# Patient Record
Sex: MALE | Race: WHITE | HISPANIC OR LATINO | URBAN - METROPOLITAN AREA
[De-identification: names, ages, dates, MRNs, and addresses within clinical notes are randomized per-mention and may not be internally consistent; named-entity substitution may affect disease eponyms.]

---

## 2017-02-06 ENCOUNTER — OUTPATIENT (OUTPATIENT)
Dept: OUTPATIENT SERVICES | Facility: HOSPITAL | Age: 72
LOS: 1 days | Discharge: HOME | End: 2017-02-06

## 2017-06-27 DIAGNOSIS — K21.9 GASTRO-ESOPHAGEAL REFLUX DISEASE WITHOUT ESOPHAGITIS: ICD-10-CM

## 2017-06-27 DIAGNOSIS — Z23 ENCOUNTER FOR IMMUNIZATION: ICD-10-CM

## 2017-06-27 DIAGNOSIS — H81.10 BENIGN PAROXYSMAL VERTIGO, UNSPECIFIED EAR: ICD-10-CM

## 2017-06-27 DIAGNOSIS — R94.5 ABNORMAL RESULTS OF LIVER FUNCTION STUDIES: ICD-10-CM

## 2017-10-24 PROBLEM — Z00.00 ENCOUNTER FOR PREVENTIVE HEALTH EXAMINATION: Status: ACTIVE | Noted: 2017-10-24

## 2017-12-04 ENCOUNTER — APPOINTMENT (OUTPATIENT)
Dept: UROLOGY | Facility: CLINIC | Age: 72
End: 2017-12-04

## 2018-01-16 ENCOUNTER — APPOINTMENT (OUTPATIENT)
Dept: UROLOGY | Facility: CLINIC | Age: 73
End: 2018-01-16
Payer: MEDICARE

## 2018-01-16 VITALS
WEIGHT: 170 LBS | BODY MASS INDEX: 24.34 KG/M2 | HEART RATE: 99 BPM | SYSTOLIC BLOOD PRESSURE: 130 MMHG | HEIGHT: 70 IN | DIASTOLIC BLOOD PRESSURE: 72 MMHG

## 2018-01-16 DIAGNOSIS — Z63.4 DISAPPEARANCE AND DEATH OF FAMILY MEMBER: ICD-10-CM

## 2018-01-16 DIAGNOSIS — Z87.891 PERSONAL HISTORY OF NICOTINE DEPENDENCE: ICD-10-CM

## 2018-01-16 DIAGNOSIS — E78.00 PURE HYPERCHOLESTEROLEMIA, UNSPECIFIED: ICD-10-CM

## 2018-01-16 LAB
BILIRUB UR QL STRIP: NORMAL
CLARITY UR: CLEAR
COLLECTION METHOD: NORMAL
GLUCOSE UR-MCNC: NORMAL
HCG UR QL: 2 MG/DL
HGB UR QL STRIP.AUTO: NORMAL
KETONES UR-MCNC: NORMAL
LEUKOCYTE ESTERASE UR QL STRIP: NORMAL
NITRITE UR QL STRIP: NORMAL
PH UR STRIP: 6
PROT UR STRIP-MCNC: NORMAL
SP GR UR STRIP: 1.01

## 2018-01-16 PROCEDURE — 99205 OFFICE O/P NEW HI 60 MIN: CPT

## 2018-01-16 PROCEDURE — 81003 URINALYSIS AUTO W/O SCOPE: CPT | Mod: QW

## 2018-01-16 RX ORDER — SIMVASTATIN 20 MG/1
20 TABLET, FILM COATED ORAL
Refills: 0 | Status: ACTIVE | COMMUNITY

## 2018-01-16 SDOH — SOCIAL STABILITY - SOCIAL INSECURITY: DISSAPEARANCE AND DEATH OF FAMILY MEMBER: Z63.4

## 2018-02-06 ENCOUNTER — APPOINTMENT (OUTPATIENT)
Dept: UROLOGY | Facility: CLINIC | Age: 73
End: 2018-02-06
Payer: MEDICARE

## 2018-02-06 DIAGNOSIS — M19.90 UNSPECIFIED OSTEOARTHRITIS, UNSPECIFIED SITE: ICD-10-CM

## 2018-02-06 PROCEDURE — 99214 OFFICE O/P EST MOD 30 MIN: CPT

## 2018-02-12 LAB
PSA FREE FLD-MCNC: 20.6
PSA FREE SERPL-MCNC: 1.02 NG/ML
PSA SERPL-MCNC: 4.96 NG/ML

## 2018-02-12 RX ORDER — AMOXICILLIN AND CLAVULANATE POTASSIUM 875; 125 MG/1; MG/1
875-125 TABLET, COATED ORAL TWICE DAILY
Qty: 6 | Refills: 0 | Status: ACTIVE | COMMUNITY
Start: 2018-02-12 | End: 1900-01-01

## 2018-02-14 ENCOUNTER — APPOINTMENT (OUTPATIENT)
Dept: HEMATOLOGY ONCOLOGY | Facility: CLINIC | Age: 73
End: 2018-02-14

## 2018-03-08 ENCOUNTER — APPOINTMENT (OUTPATIENT)
Dept: UROLOGY | Facility: CLINIC | Age: 73
End: 2018-03-08
Payer: MEDICARE

## 2018-03-08 ENCOUNTER — OUTPATIENT (OUTPATIENT)
Dept: OUTPATIENT SERVICES | Facility: HOSPITAL | Age: 73
LOS: 1 days | End: 2018-03-08
Payer: MEDICARE

## 2018-03-08 VITALS
BODY MASS INDEX: 24.34 KG/M2 | TEMPERATURE: 97.7 F | HEIGHT: 70 IN | WEIGHT: 170 LBS | HEART RATE: 90 BPM | RESPIRATION RATE: 16 BRPM | SYSTOLIC BLOOD PRESSURE: 131 MMHG | DIASTOLIC BLOOD PRESSURE: 73 MMHG

## 2018-03-08 DIAGNOSIS — R35.0 FREQUENCY OF MICTURITION: ICD-10-CM

## 2018-03-08 PROCEDURE — 55700: CPT

## 2018-03-08 PROCEDURE — 76872 US TRANSRECTAL: CPT

## 2018-03-08 PROCEDURE — 76942 ECHO GUIDE FOR BIOPSY: CPT | Mod: 26,59

## 2018-03-08 PROCEDURE — 76942 ECHO GUIDE FOR BIOPSY: CPT | Mod: 59

## 2018-03-08 PROCEDURE — 76872 US TRANSRECTAL: CPT | Mod: 26

## 2018-03-08 RX ORDER — AMIKACIN SULFATE 250 MG/ML
500 INJECTION, SOLUTION INTRAMUSCULAR; INTRAVENOUS
Qty: 2 | Refills: 0 | Status: COMPLETED | OUTPATIENT
Start: 2018-03-08 | End: 2018-03-08

## 2018-03-08 RX ORDER — AMIKACIN SULFATE 250 MG/ML
500 INJECTION, SOLUTION INTRAMUSCULAR; INTRAVENOUS
Refills: 0 | Status: COMPLETED | OUTPATIENT
Start: 2018-03-08

## 2018-03-08 RX ADMIN — AMIKACIN SULFATE 0 MG/2ML: 500 INJECTION, SOLUTION INTRAMUSCULAR; INTRAVENOUS at 00:00

## 2018-03-12 DIAGNOSIS — N40.2 NODULAR PROSTATE WITHOUT LOWER URINARY TRACT SYMPTOMS: ICD-10-CM

## 2018-03-12 DIAGNOSIS — R93.5 ABNORMAL FINDINGS ON DIAGNOSTIC IMAGING OF OTHER ABDOMINAL REGIONS, INCLUDING RETROPERITONEUM: ICD-10-CM

## 2018-03-12 LAB — CORE LAB BIOPSY: NORMAL

## 2018-03-22 ENCOUNTER — APPOINTMENT (OUTPATIENT)
Dept: UROLOGY | Facility: CLINIC | Age: 73
End: 2018-03-22

## 2018-03-29 ENCOUNTER — APPOINTMENT (OUTPATIENT)
Dept: UROLOGY | Facility: CLINIC | Age: 73
End: 2018-03-29
Payer: MEDICARE

## 2018-03-29 VITALS
DIASTOLIC BLOOD PRESSURE: 69 MMHG | WEIGHT: 170 LBS | HEIGHT: 70 IN | SYSTOLIC BLOOD PRESSURE: 119 MMHG | HEART RATE: 86 BPM | BODY MASS INDEX: 24.34 KG/M2

## 2018-03-29 PROCEDURE — 99213 OFFICE O/P EST LOW 20 MIN: CPT

## 2018-03-29 RX ORDER — ALFUZOSIN HYDROCHLORIDE 10 MG/1
10 TABLET, EXTENDED RELEASE ORAL
Qty: 30 | Refills: 5 | Status: ACTIVE | COMMUNITY
Start: 2018-03-29 | End: 1900-01-01

## 2018-04-13 ENCOUNTER — RX RENEWAL (OUTPATIENT)
Age: 73
End: 2018-04-13

## 2018-04-13 RX ORDER — ALFUZOSIN HYDROCHLORIDE 10 MG/1
10 TABLET, EXTENDED RELEASE ORAL
Qty: 90 | Refills: 3 | Status: ACTIVE | COMMUNITY
Start: 2018-04-13 | End: 1900-01-01

## 2018-04-19 ENCOUNTER — RX RENEWAL (OUTPATIENT)
Age: 73
End: 2018-04-19

## 2018-05-01 ENCOUNTER — APPOINTMENT (OUTPATIENT)
Dept: UROLOGY | Facility: CLINIC | Age: 73
End: 2018-05-01

## 2018-06-21 ENCOUNTER — APPOINTMENT (OUTPATIENT)
Dept: UROLOGY | Facility: CLINIC | Age: 73
End: 2018-06-21

## 2019-01-02 ENCOUNTER — APPOINTMENT (OUTPATIENT)
Dept: UROLOGY | Facility: CLINIC | Age: 74
End: 2019-01-02

## 2019-06-27 ENCOUNTER — RX RENEWAL (OUTPATIENT)
Age: 74
End: 2019-06-27

## 2019-09-23 ENCOUNTER — RX RENEWAL (OUTPATIENT)
Age: 74
End: 2019-09-23

## 2019-09-23 RX ORDER — ALFUZOSIN HYDROCHLORIDE 10 MG/1
10 TABLET, EXTENDED RELEASE ORAL
Qty: 90 | Refills: 3 | Status: ACTIVE | COMMUNITY
Start: 2018-04-19 | End: 1900-01-01

## 2020-02-04 ENCOUNTER — APPOINTMENT (OUTPATIENT)
Dept: UROLOGY | Facility: CLINIC | Age: 75
End: 2020-02-04
Payer: MEDICARE

## 2020-02-04 LAB
BILIRUB UR QL STRIP: NORMAL
COLLECTION METHOD: NORMAL
GLUCOSE UR-MCNC: NORMAL
HCG UR QL: 2 MG/DL
HGB UR QL STRIP.AUTO: NORMAL
KETONES UR-MCNC: NORMAL
LEUKOCYTE ESTERASE UR QL STRIP: NORMAL
NITRITE UR QL STRIP: NORMAL
PH UR STRIP: 6
PROT UR STRIP-MCNC: NORMAL
SP GR UR STRIP: 1.02

## 2020-02-04 PROCEDURE — 81003 URINALYSIS AUTO W/O SCOPE: CPT | Mod: QW

## 2020-02-04 PROCEDURE — 99213 OFFICE O/P EST LOW 20 MIN: CPT

## 2020-02-04 NOTE — PHYSICAL EXAM
[General Appearance - Well Developed] : well developed [Normal Appearance] : normal appearance [Well Groomed] : well groomed [General Appearance - Well Nourished] : well nourished [General Appearance - In No Acute Distress] : no acute distress [Abdomen Soft] : soft [Abdomen Tenderness] : non-tender [Costovertebral Angle Tenderness] : no ~M costovertebral angle tenderness [Scrotum] : the scrotum was normal [Testes Mass (___cm)] : there were no testicular masses [Testes Tenderness] : no tenderness of the testes [] : no respiratory distress [Edema] : no peripheral edema [Respiration, Rhythm And Depth] : normal respiratory rhythm and effort [Exaggerated Use Of Accessory Muscles For Inspiration] : no accessory muscle use [Oriented To Time, Place, And Person] : oriented to person, place, and time [Affect] : the affect was normal [Mood] : the mood was normal [Normal Station and Gait] : the gait and station were normal for the patient's age [Not Anxious] : not anxious [No Focal Deficits] : no focal deficits [No Palpable Adenopathy] : no palpable adenopathy

## 2020-02-04 NOTE — HISTORY OF PRESENT ILLNESS
[Urinary Frequency] : urinary frequency [Nocturia] : nocturia [Weak Stream] : weak stream [None] : None [FreeTextEntry1] : 74 y.o male with h/o BPH, prostate nodule seen on MP MRI prostate\par s/p MRI guided prostate biopsy with Dr. Gallardo 3/8/2018 / TRUS 87gms/  path- BPH\par pt. doing ok\par on flomax x many years\par c/o seen blod clot with x 3-4  1-2 months ago -- no gross  hematuria \par gives h/o TUNA many years ago\par u/a neg\par 2/2018 psa = 4.6\par \par  [Urinary Incontinence] : no urinary incontinence [Urinary Urgency] : no urinary urgency [Urinary Retention] : no urinary retention [Hematuria - Gross] : no gross hematuria [Dysuria] : no dysuria [Abdominal Pain] : no abdominal pain [Flank Pain] : no flank pain [Fever] : no fever [Anorexia] : no anorexia

## 2020-02-04 NOTE — ASSESSMENT
[FreeTextEntry1] :  1.  BPH,\par 2.  prostate nodule seen on MP MRI prostate -- neg PNBX x 2- last MRI fusion withDr. Gallardo 3/2018\par 3. Urethrorraghia \par \par plan = \par -switch to flomkax - side effects and directions discussed\par -dw pt. addition of avodart, but he is not interested secondary to side effects\par -renal and bladder US, uroflow\par -psa \par - rtn 3-4 weeks

## 2020-02-18 LAB
PSA FREE FLD-MCNC: 21 %
PSA FREE SERPL-MCNC: 0.8 NG/ML
PSA SERPL-MCNC: 3.79 NG/ML

## 2020-05-06 ENCOUNTER — APPOINTMENT (OUTPATIENT)
Dept: UROLOGY | Facility: CLINIC | Age: 75
End: 2020-05-06

## 2021-05-01 ENCOUNTER — RESULT REVIEW (OUTPATIENT)
Age: 76
End: 2021-05-01

## 2021-05-01 ENCOUNTER — OUTPATIENT (OUTPATIENT)
Dept: OUTPATIENT SERVICES | Facility: HOSPITAL | Age: 76
LOS: 1 days | Discharge: HOME | End: 2021-05-01
Payer: MEDICARE

## 2021-05-01 DIAGNOSIS — R97.20 ELEVATED PROSTATE SPECIFIC ANTIGEN [PSA]: ICD-10-CM

## 2021-05-01 DIAGNOSIS — N40.1 BENIGN PROSTATIC HYPERPLASIA WITH LOWER URINARY TRACT SYMPTOMS: ICD-10-CM

## 2021-05-01 LAB
PSA FREE FLD-MCNC: 20 %
PSA FREE SERPL-MCNC: 0.81 NG/ML
PSA SERPL-MCNC: 3.96 NG/ML

## 2021-05-01 PROCEDURE — 76770 US EXAM ABDO BACK WALL COMP: CPT | Mod: 26

## 2021-05-06 ENCOUNTER — APPOINTMENT (OUTPATIENT)
Dept: UROLOGY | Facility: CLINIC | Age: 76
End: 2021-05-06
Payer: MEDICARE

## 2021-05-06 PROCEDURE — 51741 ELECTRO-UROFLOWMETRY FIRST: CPT

## 2021-05-06 PROCEDURE — 51798 US URINE CAPACITY MEASURE: CPT

## 2021-05-06 PROCEDURE — 99072 ADDL SUPL MATRL&STAF TM PHE: CPT

## 2021-05-06 PROCEDURE — 99213 OFFICE O/P EST LOW 20 MIN: CPT | Mod: 25

## 2021-10-28 ENCOUNTER — LABORATORY RESULT (OUTPATIENT)
Age: 76
End: 2021-10-28

## 2021-11-09 ENCOUNTER — APPOINTMENT (OUTPATIENT)
Dept: UROLOGY | Facility: CLINIC | Age: 76
End: 2021-11-09
Payer: MEDICARE

## 2021-11-09 PROCEDURE — 99214 OFFICE O/P EST MOD 30 MIN: CPT

## 2021-11-09 RX ORDER — TAMSULOSIN HYDROCHLORIDE 0.4 MG/1
0.4 CAPSULE ORAL
Refills: 0 | Status: DISCONTINUED | COMMUNITY
End: 2021-11-09

## 2021-11-09 NOTE — ASSESSMENT
[FreeTextEntry1] : 1. BPH\par 2. prostate nodule seen on MP MRI prostate -- neg PNBX x 2- last MRI fusion with Dr. Gallardo 3/2018.\par 3. newly elevated PSA \par 4. Bilateral renal cysts\par \par Plan:\par -Discussed PSA results with the patient.\par -Discussed the significance of mpMRI.\par -mpMRI now.\par -Continue Alfuzosin.\par -RTO 4 weeks.\par

## 2021-11-09 NOTE — PHYSICAL EXAM
[General Appearance - Well Developed] : well developed [General Appearance - Well Nourished] : well nourished [Normal Appearance] : normal appearance [Well Groomed] : well groomed [General Appearance - In No Acute Distress] : no acute distress [Abdomen Soft] : soft [Abdomen Tenderness] : non-tender [Costovertebral Angle Tenderness] : no ~M costovertebral angle tenderness [Scrotum] : the scrotum was normal [Testes Tenderness] : no tenderness of the testes [Testes Mass (___cm)] : there were no testicular masses [Edema] : no peripheral edema [] : no respiratory distress [Respiration, Rhythm And Depth] : normal respiratory rhythm and effort [Exaggerated Use Of Accessory Muscles For Inspiration] : no accessory muscle use [Oriented To Time, Place, And Person] : oriented to person, place, and time [Affect] : the affect was normal [Mood] : the mood was normal [Not Anxious] : not anxious [Normal Station and Gait] : the gait and station were normal for the patient's age [No Focal Deficits] : no focal deficits [No Palpable Adenopathy] : no palpable adenopathy

## 2021-11-09 NOTE — END OF VISIT
[FreeTextEntry3] : Patient notes was transcribed by dayton Hannah under the supervision of Dr. Mane.\par And I have   reviewed the patient's chart and agree that it alines  with my medical decisions.\par

## 2021-11-09 NOTE — HISTORY OF PRESENT ILLNESS
[Urinary Frequency] : urinary frequency [Nocturia] : nocturia [Weak Stream] : weak stream [None] : None [FreeTextEntry1] : 76 year old male presents to the office for follow up of BPH and newly elevated PSA, prostate nodule Pirad = 4  seen on MP MRI prostate s/p MRI guided prostate biopsy with Dr. Gallardo 3/8/2018 / TRUS 87gms/ path- BPH. He reports feeling good and having a good appetite. He denies gross hematuria. Patient also gives a history of TUNA many years ago. He reports biking for exercise. Patient is currently on Alfuzosin. \par \par \par All past and present data reviewed:\par 02/2018 PSA= 4.6\par 02/2020 PSA= 3.7 \par 04/2021 PSA= 3.9 // US-- bilateral renal cysts // bladder vol= 348 cc/ pvr= 100cc \par 10/2021 PSA= 5.9 %FREE= 17\par                \par 05/2021 uroflow = volume== 80 cc, therefore interpretation is not valid    /    Bladder scanning = 67 cc\par  [Urinary Incontinence] : no urinary incontinence [Urinary Retention] : no urinary retention [Urinary Urgency] : no urinary urgency [Dysuria] : no dysuria [Hematuria - Gross] : no gross hematuria [Fever] : no fever [Fatigue] : no fatigue

## 2021-11-20 ENCOUNTER — OUTPATIENT (OUTPATIENT)
Dept: OUTPATIENT SERVICES | Facility: HOSPITAL | Age: 76
LOS: 1 days | Discharge: HOME | End: 2021-11-20
Payer: MEDICARE

## 2021-11-20 ENCOUNTER — RESULT REVIEW (OUTPATIENT)
Age: 76
End: 2021-11-20

## 2021-11-20 DIAGNOSIS — N40.2 NODULAR PROSTATE WITHOUT LOWER URINARY TRACT SYMPTOMS: ICD-10-CM

## 2021-11-20 PROCEDURE — 72197 MRI PELVIS W/O & W/DYE: CPT | Mod: 26

## 2022-01-04 ENCOUNTER — APPOINTMENT (OUTPATIENT)
Dept: UROLOGY | Facility: CLINIC | Age: 77
End: 2022-01-04
Payer: MEDICARE

## 2022-01-04 VITALS — WEIGHT: 168 LBS | HEIGHT: 70 IN | BODY MASS INDEX: 24.05 KG/M2

## 2022-01-04 DIAGNOSIS — N28.1 CYST OF KIDNEY, ACQUIRED: ICD-10-CM

## 2022-01-04 PROCEDURE — 99213 OFFICE O/P EST LOW 20 MIN: CPT

## 2022-01-04 NOTE — ASSESSMENT
[FreeTextEntry1] : 1. BPH\par 2. prostate nodule seen on MP MRI prostate -- neg PNBX x 2- last MRI fusion with Dr. Gallardo 3/2018.\par 3. newly elevated PSA \par 4. Bilateral renal cysts\par \par Plan:\par -Discussed MRI results with the patient.  Also discussed the likelihood for the presence of prostate carcinoma regarding MRI findings of PI-RADS 3 lesions.  We discussed the option of follow-up PSA testing or proceeding to MRI/ultrasound fusion TP biopsy.  Following our discussion, patient decided to proceed with prostate biopsy.\par -MRI/US Fusion biopsy - Dr. Valentine.\par -RTO after biopsy.

## 2022-01-04 NOTE — HISTORY OF PRESENT ILLNESS
[Urinary Frequency] : urinary frequency [Nocturia] : nocturia [Weak Stream] : weak stream [None] : None [FreeTextEntry1] : 76 year old male presents to the office for follow up of BPH and newly elevated PSA, prostate nodule PIRAD = 4  seen on MP MRI prostate s/p MRI guided prostate biopsy with Dr. Gallardo 3/8/2018 / TRUS 87gms/ path- BPH. He does not have a family history of prostate cancer. He denies any fever, nausea, and other constitutional symptoms. \par \par All past and present data reviewed:\par 02/2018 PSA= 4.6\par 02/2020 PSA= 3.7 \par 04/2021 PSA= 3.9 // US-- bilateral renal cysts // bladder vol= 348 cc/ pvr= 100cc \par 10/2021 PSA= 5.9 %FREE= 17  //  PSAD= 0.07\par                \par 05/2021 uroflow = volume== 80 cc, therefore interpretation is not valid    /    Bladder scanning = 67 cc\par \par 11/20/2021 mpMRI= 79 gm, lesion 1= right posterior lateral apex peripheral zone 5 mm   //   T2-WI= moderate hypointense focus/mass  //  DCE negative (no early enhancement)  //  DWI= hypointense on EDC imaging //  PIRADS 3  //  artifact secondary to rectal gas\par  [Urinary Incontinence] : no urinary incontinence [Urinary Retention] : no urinary retention [Urinary Urgency] : no urinary urgency [Dysuria] : no dysuria [Hematuria - Gross] : no gross hematuria [Fever] : no fever [Fatigue] : no fatigue

## 2022-01-14 ENCOUNTER — APPOINTMENT (OUTPATIENT)
Dept: UROLOGY | Facility: CLINIC | Age: 77
End: 2022-01-14
Payer: MEDICARE

## 2022-01-14 PROCEDURE — 99215 OFFICE O/P EST HI 40 MIN: CPT | Mod: 95

## 2022-01-14 NOTE — HISTORY OF PRESENT ILLNESS
[Home] : at home, [unfilled] , at the time of the visit. [Medical Office: ([facility])___] : at the medical office located in  [Verbal consent obtained from patient] : the patient, [unfilled] [Family Member] : family member [FreeTextEntry3] : Son [FreeTextEntry1] : JANIYA GUILLEN is a 76 year old male who presents for consultation for elevated PSA, BPH, prostate lesion PIRAD = 4 seen on MP MRI prostate 2017 s/p MRI guided prostate biopsy with Dr. Gallardo 3/8/2018 / TRUS 87gms/ path- BPH presents for opinion regarding rising PSA and PIRADS 3 lesion.\par \par He does not have a family history of prostate cancer. on alpha blocker for bph.  Patient preferred son as \par \par 11/20/2021 mpMR images visualized and compared with prior I= 79 gm, with signif median lobe lesion 1= right posterior lateral apex peripheral zone 5 mm // T2-WI= moderate hypointense focus/mass // DCE negative (no early enhancement) // DWI= hypointense on ADC imaging // PIRADS 3 // artifact secondary to rectal gas, agree w radiologist -- 11/20/2021 this lesion appears slightly more rounded as compared with MRI 2017 however overall minimal change\par And I do not appreciate any hyperintensity on diffusion-weighted imaging\par \par 02/2018 PSA= 4.6\par 02/2020 PSA= 3.7 \par 04/2021 PSA= 3.9 // US-- bilateral renal cysts // bladder vol= 348 cc/ pvr= 100cc \par 10/2021 PSA= 5.9 %FREE= 17 // PSAD= 0.07\par

## 2022-01-14 NOTE — ASSESSMENT
[FreeTextEntry1] : JANIYA GUILLEN is a 76 year old male who presents for consultation for elevated PSA, BPH, prostate lesion PIRAD = 4 seen on MP MRI prostate 2017 s/p MRI guided prostate biopsy with Dr. Gallardo 3/8/2018 / TRUS 87gms/ path- BPH presents for opinion regarding rising PSA and PIRADS 3 lesion.\par \par Comparing the past and present MRI I do not see significant change and additionally the patient's PSA density of 0.075 is still below the threshold for prostate biopsy as per our [facility] algorithm, where we typically consider bx with PSAD 0.15 for PIRADS 3 (some cases > 0.10).\par Additionally the patient states he has had prior PSA fluctuations up to a PSA of 9.  He declines finasteride.\par We discussed the pros and cons of biopsy patient elects observation.\par He will follow-up 6 mo from prior PSA , with a psa prior.\par Assuming the PSA stabilizes afterward he will continue follow-up with Dr. Mane\par \par  Patient understands the risk of deferring prostate biopsy such as developing metastatic disease and understands the importance of follow-up.\par \par

## 2022-05-06 ENCOUNTER — NON-APPOINTMENT (OUTPATIENT)
Age: 77
End: 2022-05-06

## 2022-05-06 LAB
PSA FREE FLD-MCNC: 18 %
PSA FREE SERPL-MCNC: 0.87 NG/ML
PSA SERPL-MCNC: 4.94 NG/ML

## 2022-06-10 ENCOUNTER — RX RENEWAL (OUTPATIENT)
Age: 77
End: 2022-06-10

## 2022-06-16 ENCOUNTER — APPOINTMENT (OUTPATIENT)
Dept: UROLOGY | Facility: CLINIC | Age: 77
End: 2022-06-16

## 2022-08-02 ENCOUNTER — APPOINTMENT (OUTPATIENT)
Dept: UROLOGY | Facility: CLINIC | Age: 77
End: 2022-08-02

## 2022-10-06 ENCOUNTER — APPOINTMENT (OUTPATIENT)
Dept: UROLOGY | Facility: CLINIC | Age: 77
End: 2022-10-06

## 2022-10-06 VITALS
OXYGEN SATURATION: 98 % | DIASTOLIC BLOOD PRESSURE: 72 MMHG | SYSTOLIC BLOOD PRESSURE: 117 MMHG | BODY MASS INDEX: 24.05 KG/M2 | WEIGHT: 168 LBS | RESPIRATION RATE: 16 BRPM | TEMPERATURE: 97.2 F | HEART RATE: 85 BPM | HEIGHT: 70 IN

## 2022-10-06 DIAGNOSIS — R93.5 ABNORMAL FINDINGS ON DIAGNOSTIC IMAGING OF OTHER ABDOMINAL REGIONS, INCLUDING RETROPERITONEUM: ICD-10-CM

## 2022-10-06 DIAGNOSIS — N40.2 NODULAR PROSTATE W/OUT LOWER URINARY TRACT SYMPTOMS: ICD-10-CM

## 2022-10-06 PROCEDURE — 99213 OFFICE O/P EST LOW 20 MIN: CPT

## 2022-10-06 NOTE — HISTORY OF PRESENT ILLNESS
[FreeTextEntry1] : JANIYA GUILLEN is a 76 year old male who presents for consultation for elevated PSA, BPH, prostate lesion PIRAD = 4 seen on MP MRI prostate 2017 s/p MRI guided prostate biopsy with Dr. Gallardo 3/8/2018 / TRUS 87gms/ path- BPH presents for opinion regarding rising PSA and PIRADS 3 lesion.\par \par He is here with his son who is acting as a primary  . He denies gross hematuria , dysuria or associated symptoms . \par \par PSA 05/22 - 4.97 % free 18 \par \par He does not have a family history of prostate cancer. on alpha blocker for bph. \par \par Previously\par 11/20/2021 mpMR images visualized and compared with prior I= 79 gm, with signif median lobe lesion 1= right posterior lateral apex peripheral zone 5 mm // T2-WI= moderate hypointense focus/mass // DCE negative (no early enhancement) // DWI= hypointense on ADC imaging // PIRADS 3 // artifact secondary to rectal gas, agree w radiologist -- 11/20/2021 this lesion appears slightly more rounded as compared with MRI 2017 however overall minimal change\par And I do not appreciate any hyperintensity on diffusion-weighted imaging\par \par 02/2018 PSA= 4.6\par 02/2020 PSA= 3.7 \par 04/2021 PSA= 3.9 // US-- bilateral renal cysts // bladder vol= 348 cc/ pvr= 100cc \par 10/2021 PSA= 5.9 %FREE= 17 // PSAD= 0.07\par

## 2022-10-06 NOTE — ADDENDUM
[FreeTextEntry1] : Patient's note was transcribed with the assistance of a medical scribe under the supervision of Dr. Valentine.\par I, Dr. Valentine, have reviewed the patient's chart and agree that it aligns with my medical decisions.\par Marina Martinez, our scribe, also served as a chaperone for physical examination purposes.\par \par \par

## 2022-10-06 NOTE — ASSESSMENT
[FreeTextEntry1] : JANIYA GUILLEN is a 76 year old male who presents for consultation for elevated PSA, BPH, prostate lesion PIRAD = 4 seen on MP MRI prostate 2017 s/p MRI guided prostate biopsy with Dr. Gallardo 3/8/2018 / TRUS 87gms/ path- BPH presents for opinion regarding rising PSA and PIRADS 3 lesion.\par PSA now stable and decreased.\par \par Comparing the past and present MRI I do not see significant change and additionally the patient's PSA density of ~0.063 is still below the threshold for prostate biopsy as per our [facility] algorithm, where we typically consider bx with PSAD 0.15 for PIRADS 3 (some cases > 0.10).\par Additionally the patient states he has had prior PSA fluctuations up to a PSA of 9.  He declines finasteride.\par Again we discussed the pros and cons of biopsy patient elects observation.\par \par Plan \par F/U in 6 months with  , PSA prior\par Patient understands the risk of deferring prostate biopsy such as developing metastatic disease and understands the importance of follow-up.\par \par

## 2022-10-31 ENCOUNTER — APPOINTMENT (OUTPATIENT)
Dept: UROLOGY | Facility: CLINIC | Age: 77
End: 2022-10-31

## 2022-10-31 VITALS
TEMPERATURE: 98 F | DIASTOLIC BLOOD PRESSURE: 78 MMHG | HEIGHT: 70 IN | RESPIRATION RATE: 14 BRPM | WEIGHT: 165 LBS | HEART RATE: 85 BPM | SYSTOLIC BLOOD PRESSURE: 126 MMHG | BODY MASS INDEX: 23.62 KG/M2

## 2022-10-31 DIAGNOSIS — R30.0 DYSURIA: ICD-10-CM

## 2022-10-31 DIAGNOSIS — R39.9 UNSPECIFIED SYMPTOMS AND SIGNS INVOLVING THE GENITOURINARY SYSTEM: ICD-10-CM

## 2022-10-31 PROCEDURE — 99214 OFFICE O/P EST MOD 30 MIN: CPT

## 2022-10-31 RX ORDER — IBUPROFEN 600 MG/1
600 TABLET, FILM COATED ORAL 3 TIMES DAILY
Qty: 15 | Refills: 0 | Status: ACTIVE | COMMUNITY
Start: 2022-10-31 | End: 1900-01-01

## 2022-10-31 NOTE — ASSESSMENT
[FreeTextEntry1] : JANIYA GUILLEN is a 76 year old male who presents for consultation for elevated PSA, BPH, prostate lesion PIRAD = 4 seen on MP MRI prostate 2017 s/p MRI guided prostate biopsy with Dr. Gallardo 3/8/2018 / TRUS 87gms/ path- BPH presents for opinion regarding rising PSA and PIRADS 3 lesion.\par \par Presents to office with chief complaint of intermittent dysuria ongoing for 3 weeks.  PVR and urinalysis dipstick today are negative.\par COLTON reveals a firm nonboggy prostate.  No tenderness to palpation.\par also with flank pain\par \par Plan\par -Urinalysis and urine culture\par -Ibuprofen 600 mg 3 times daily as needed\par -Ultrasound of kidney and bladder to assess for stones\par -Follow-up 4 to 6 weeks to review and reassess\par cont alfuzosin\par \par \par Re elevated psa, as prior--\par Comparing the past and present MRI I do not see significant change and additionally the patient's PSA density of ~0.063 is still below the threshold for prostate biopsy as per our [facility] algorithm, where we typically consider bx with PSAD 0.15 for PIRADS 3 (some cases > 0.10).\par Additionally the patient states he has had prior PSA fluctuations up to a PSA of 9. He declines finasteride.\par

## 2022-10-31 NOTE — PHYSICAL EXAM
[Well Groomed] : well groomed [General Appearance - In No Acute Distress] : no acute distress [Abdomen Soft] : soft [Abdomen Tenderness] : non-tender [Penis Abnormality] : normal uncircumcised penis [Prostate Tenderness] : the prostate was not tender [Prostate Size ___ gm] : prostate size [unfilled] gm [] : no respiratory distress [Oriented To Time, Place, And Person] : oriented to person, place, and time [Normal Station and Gait] : the gait and station were normal for the patient's age [No Focal Deficits] : no focal deficits [FreeTextEntry1] : Able to retract and replace foreskin.

## 2022-11-01 LAB
APPEARANCE: CLEAR
BILIRUBIN URINE: NEGATIVE
BLOOD URINE: NEGATIVE
COLOR: YELLOW
GLUCOSE QUALITATIVE U: NEGATIVE
KETONES URINE: NEGATIVE
LEUKOCYTE ESTERASE URINE: NEGATIVE
NITRITE URINE: NEGATIVE
PH URINE: 6
PROTEIN URINE: NORMAL
SPECIFIC GRAVITY URINE: 1.03
UROBILINOGEN URINE: NORMAL

## 2022-11-02 LAB — BACTERIA UR CULT: NORMAL

## 2022-12-15 ENCOUNTER — APPOINTMENT (OUTPATIENT)
Dept: UROLOGY | Facility: CLINIC | Age: 77
End: 2022-12-15

## 2022-12-27 ENCOUNTER — RX RENEWAL (OUTPATIENT)
Age: 77
End: 2022-12-27

## 2022-12-27 RX ORDER — ALFUZOSIN HYDROCHLORIDE 10 MG/1
10 TABLET, EXTENDED RELEASE ORAL
Qty: 30 | Refills: 5 | Status: ACTIVE | COMMUNITY
Start: 2021-11-09 | End: 1900-01-01

## 2023-01-18 ENCOUNTER — OUTPATIENT (OUTPATIENT)
Dept: OUTPATIENT SERVICES | Facility: HOSPITAL | Age: 78
LOS: 1 days | Discharge: HOME | End: 2023-01-18
Payer: COMMERCIAL

## 2023-01-18 ENCOUNTER — RESULT REVIEW (OUTPATIENT)
Age: 78
End: 2023-01-18

## 2023-01-18 DIAGNOSIS — R10.9 UNSPECIFIED ABDOMINAL PAIN: ICD-10-CM

## 2023-01-18 DIAGNOSIS — R10.2 PELVIC AND PERINEAL PAIN: ICD-10-CM

## 2023-01-18 DIAGNOSIS — N20.0 CALCULUS OF KIDNEY: ICD-10-CM

## 2023-01-18 PROCEDURE — 76770 US EXAM ABDO BACK WALL COMP: CPT | Mod: 26

## 2023-01-23 ENCOUNTER — NON-APPOINTMENT (OUTPATIENT)
Age: 78
End: 2023-01-23

## 2023-02-07 ENCOUNTER — APPOINTMENT (OUTPATIENT)
Dept: UROLOGY | Facility: CLINIC | Age: 78
End: 2023-02-07
Payer: MEDICARE

## 2023-02-07 VITALS
BODY MASS INDEX: 23.77 KG/M2 | WEIGHT: 166 LBS | HEIGHT: 70 IN | HEART RATE: 80 BPM | SYSTOLIC BLOOD PRESSURE: 120 MMHG | DIASTOLIC BLOOD PRESSURE: 80 MMHG

## 2023-02-07 DIAGNOSIS — N13.8 BENIGN PROSTATIC HYPERPLASIA WITH LOWER URINARY TRACT SYMPMS: ICD-10-CM

## 2023-02-07 DIAGNOSIS — R10.9 UNSPECIFIED ABDOMINAL PAIN: ICD-10-CM

## 2023-02-07 DIAGNOSIS — N40.1 BENIGN PROSTATIC HYPERPLASIA WITH LOWER URINARY TRACT SYMPMS: ICD-10-CM

## 2023-02-07 DIAGNOSIS — R97.20 ELEVATED PROSTATE, SPECIFIC ANTIGEN [PSA]: ICD-10-CM

## 2023-02-07 PROCEDURE — 99214 OFFICE O/P EST MOD 30 MIN: CPT

## 2023-02-07 NOTE — HISTORY OF PRESENT ILLNESS
[FreeTextEntry1] : JANIYA GUILLEN is a 77 year old male who presents for consultation for elevated PSA, BPH, prostate lesion PIRAD = 4 seen on MP MRI prostate 2017 s/p MRI guided prostate biopsy with Dr. Gallardo 3/8/2018 / TRUS 87gms/ path- BPH presents for opinion regarding rising PSA and PIRADS 3 lesion.\par \par Pt is c/o RLQ pain , without any urinary symptoms such as gross hematuria , dysuria or N/V/F/C.  Symptoms improved last visit such as dysuria after ibuprofen.  He states that this pain has been present for about 6 months. intermittent not severe. He is currently on Alfazosin and is doing well . It should be noted that he declined finasteride in the past because of concerns of breast issues.\par \par RBUS 01/23/23 images visualized - Enlarged prostate gland with evidence of chronic bladder outlet obstruction.  Significant median lobe.\par Normal-appearing kidneys , Prostate volume -91 cc .  Left ureteral jet is seen. Right ureteral jet is not confidently seen.\par UA & UCx 10/31/22 - unremarkable \par \par previously \par His most recent hemoglobin A1c in February 2022 was 5.6%.  A comprehensive metabolic panel in August 2022 revealed a glucose of 91 and a creatinine of 0.8.\par \par PSA 05/22 - 4.97 % free 18 \par \par He does not have a family history of prostate cancer. on alpha blocker for bph. \par \par 11/20/2021 mpMR images and compared with prior I= 79 gm, with signif median lobe lesion 1= right posterior lateral apex peripheral zone 5 mm // T2-WI= moderate hypointense focus/mass // DCE negative (no early enhancement) // DWI= hypointense on ADC imaging // PIRADS 3 // artifact secondary to rectal gas, agree w radiologist -- 11/20/2021 this lesion appears slightly more rounded as compared with MRI 2017 however overall minimal change\par And I do not appreciate any hyperintensity on diffusion-weighted imaging\par \par 02/2018 PSA= 4.6\par 02/2020 PSA= 3.7 \par 04/2021 PSA= 3.9 // US-- bilateral renal cysts // bladder vol= 348 cc/ pvr= 100cc \par 10/2021 PSA= 5.9 %FREE= 17 // PSAD= 0.07\par

## 2023-02-07 NOTE — ASSESSMENT
[FreeTextEntry1] : JANIYA GUILLEN is a 77 year old male who presents for consultation for elevated PSA, BPH, prostate lesion PIRAD = 4 seen on MP MRI prostate 2017 s/p MRI guided prostate biopsy with Dr. Gallardo 3/8/2018 / TRUS 87gms/ path- BPH presents for opinion regarding rising PSA and PIRADS 3 lesion.\par \par Plan \par pt will reconsider starting Finasteride \par In light of RLQ and no R ureteral jet , a CT scan was recommended to ensure there is no other pathology and to assess for right nonobstructing ureteral stone.. He will f/u to review \par -CT A/P \par - PSA in office today \par \par \par Re elevated psa, as prior--\par Comparing the past and present MRI I do not see significant change and additionally the patient's PSA density of ~0.063 is still below the threshold for prostate biopsy as per our [facility] algorithm, where we typically consider bx with PSAD 0.15 for PIRADS 3 (some cases > 0.10).\par Additionally the patient states he has had prior PSA fluctuations up to a PSA of 9. He declines finasteride.\par

## 2023-03-07 ENCOUNTER — NON-APPOINTMENT (OUTPATIENT)
Age: 78
End: 2023-03-07

## 2023-03-07 LAB
PSA FREE FLD-MCNC: 18 %
PSA FREE SERPL-MCNC: 0.87 NG/ML
PSA SERPL-MCNC: 4.81 NG/ML

## 2023-03-21 ENCOUNTER — APPOINTMENT (OUTPATIENT)
Dept: UROLOGY | Facility: CLINIC | Age: 78
End: 2023-03-21

## 2023-04-28 ENCOUNTER — APPOINTMENT (OUTPATIENT)
Dept: UROLOGY | Facility: CLINIC | Age: 78
End: 2023-04-28

## 2023-07-27 ENCOUNTER — APPOINTMENT (OUTPATIENT)
Dept: UROLOGY | Facility: CLINIC | Age: 78
End: 2023-07-27
Payer: MEDICARE

## 2023-07-27 VITALS
OXYGEN SATURATION: 98 % | HEIGHT: 70 IN | HEART RATE: 74 BPM | SYSTOLIC BLOOD PRESSURE: 133 MMHG | WEIGHT: 165 LBS | RESPIRATION RATE: 18 BRPM | DIASTOLIC BLOOD PRESSURE: 68 MMHG | TEMPERATURE: 97.1 F | BODY MASS INDEX: 23.62 KG/M2

## 2023-07-27 DIAGNOSIS — Z78.9 OTHER SPECIFIED HEALTH STATUS: ICD-10-CM

## 2023-07-27 PROCEDURE — 99214 OFFICE O/P EST MOD 30 MIN: CPT

## 2023-07-30 NOTE — REVIEW OF SYSTEMS
[Shortness Of Breath] : no shortness of breath [Wheezing] : no wheezing [Constipation] : no constipation [Diarrhea] : no diarrhea [Dysuria] : no dysuria [Incontinence] : no incontinence [Hesitancy] : no urinary hesitancy [Arthralgias] : no arthralgias [Joint Pain] : no joint pain [Confused] : no confusion [Convulsions] : no convulsions [Negative] : Cardiovascular

## 2023-07-30 NOTE — HISTORY OF PRESENT ILLNESS
[FreeTextEntry1] : 77 year old male who presents for consultation had previously been seen by Dr. Valentine and Dr. Mane   as per prior note :  elevated PSA, BPH, prostate lesion PIRAD = 4 seen on MP MRI prostate 2017 s/p MRI guided prostate biopsy with Dr. Gallardo 3/8/2018 / TRUS 87gms/ path- BPH presents for opinion regarding rising PSA and PIRADS 3 lesion.  He is currently on Alfazosin and is doing well . It should be noted that he declined finasteride in the past because of concerns of breast issues.  RBUS 01/23/23 images visualized - Enlarged prostate gland with evidence of chronic bladder outlet obstruction.  Significant median lobe. Normal-appearing kidneys , Prostate volume -91 cc .  Left ureteral jet is seen. Right ureteral jet is not confidently seen. UA & UCx 10/31/22 - unremarkable   previously  His most recent hemoglobin A1c in February 2022 was 5.6%.  A comprehensive metabolic panel in August 2022 revealed a glucose of 91 and a creatinine of 0.8.  PSA 05/22 - 4.97 % free 18   He does not have a family history of prostate cancer. on alpha blocker for bph.   11/20/2021 mpMR images and compared with prior I= 79 gm, with signif median lobe lesion 1= right posterior lateral apex peripheral zone 5 mm // T2-WI= moderate hypointense focus/mass // DCE negative (no early enhancement) // DWI= hypointense on ADC imaging // PIRADS 3 // artifact secondary to rectal gas, agree w radiologist -- 11/20/2021 this lesion appears slightly more rounded as compared with MRI 2017 however overall minimal change And I do not appreciate any hyperintensity on diffusion-weighted imaging  02/2018 PSA= 4.6 02/2020 PSA= 3.7  04/2021 PSA= 3.9 // US-- bilateral renal cysts // bladder vol= 348 cc/ pvr= 100cc  10/2021 PSA= 5.9 %FREE= 17 // PSAD= 0.07 03/2023 PSA 4.81 ng/ml with 18% free

## 2023-07-30 NOTE — ASSESSMENT
[FreeTextEntry1] : 77 year old male who presents for consultation for elevated PSA, BPH, prostate lesion PIRAD = 4 seen on MP MRI prostate 2017 s/p MRI guided prostate biopsy with Dr. Gallardo 3/8/2018 / TRUS 87gms/ path-   Plan  renal and bladder US urine studies repeat PSA  f/u in 6 weeks to re-assess

## 2023-07-30 NOTE — PHYSICAL EXAM
[General Appearance - Well Developed] : well developed [Normal Appearance] : normal appearance [Bowel Sounds] : normal bowel sounds [] : no respiratory distress [Oriented To Time, Place, And Person] : oriented to person, place, and time [Not Anxious] : not anxious

## 2023-08-02 LAB
APPEARANCE: CLEAR
BACTERIA UR CULT: NORMAL
BILIRUBIN URINE: NEGATIVE
BLOOD URINE: NEGATIVE
COLOR: YELLOW
GLUCOSE QUALITATIVE U: NEGATIVE MG/DL
KETONES URINE: NEGATIVE MG/DL
LEUKOCYTE ESTERASE URINE: NEGATIVE
NITRITE URINE: NEGATIVE
PH URINE: 6.5
PROTEIN URINE: NEGATIVE MG/DL
SPECIFIC GRAVITY URINE: 1.02
UROBILINOGEN URINE: 0.2 MG/DL

## 2023-08-16 LAB
PSA FREE FLD-MCNC: 22 %
PSA FREE SERPL-MCNC: 0.97 NG/ML
PSA SERPL-MCNC: 4.33 NG/ML

## 2023-11-09 ENCOUNTER — APPOINTMENT (OUTPATIENT)
Dept: UROLOGY | Facility: CLINIC | Age: 78
End: 2023-11-09

## 2023-12-21 ENCOUNTER — APPOINTMENT (OUTPATIENT)
Dept: UROLOGY | Facility: CLINIC | Age: 78
End: 2023-12-21
Payer: MEDICARE

## 2023-12-21 DIAGNOSIS — N13.8 BENIGN PROSTATIC HYPERPLASIA WITH LOWER URINARY TRACT SYMPMS: ICD-10-CM

## 2023-12-21 DIAGNOSIS — N40.1 BENIGN PROSTATIC HYPERPLASIA WITH LOWER URINARY TRACT SYMPMS: ICD-10-CM

## 2023-12-21 DIAGNOSIS — R97.20 ELEVATED PROSTATE, SPECIFIC ANTIGEN [PSA]: ICD-10-CM

## 2023-12-21 PROCEDURE — 99213 OFFICE O/P EST LOW 20 MIN: CPT

## 2023-12-21 RX ORDER — FINASTERIDE 5 MG/1
5 TABLET, FILM COATED ORAL DAILY
Qty: 90 | Refills: 3 | Status: ACTIVE | COMMUNITY
Start: 2023-12-21 | End: 1900-01-01

## 2023-12-24 PROBLEM — N40.1 BENIGN LOCALIZED HYPERPLASIA OF PROSTATE WITH URINARY OBSTRUCTION: Status: ACTIVE | Noted: 2018-03-29

## 2023-12-24 PROBLEM — R97.20 ELEVATED PROSTATE SPECIFIC ANTIGEN (PSA): Status: ACTIVE | Noted: 2023-07-30

## 2023-12-24 NOTE — LETTER BODY
[Dear  ___] : Dear  [unfilled], [Consult Letter:] : I had the pleasure of evaluating your patient, [unfilled]. [Please see my note below.] : Please see my note below. [Sincerely,] : Sincerely, [FreeTextEntry3] : Deb Driscoll MD, FACS

## 2023-12-24 NOTE — HISTORY OF PRESENT ILLNESS
[FreeTextEntry1] : 78 year old male here for follow up known history of BPH and PSA elevation previous consultations with Dr. Valentine and Dr. Mane   PSA 8/2023 4.33 ng/ml  22% free  PSA 2/2023 4.81 ng/ml  18% free   as per prior note :  elevated PSA, BPH, prostate lesion PIRAD = 4 seen on MP MRI prostate 2017 s/p MRI guided prostate biopsy with Dr. Gallardo 3/8/2018 / TRUS 87gms/ path- BPH presents for opinion regarding rising PSA and PIRADS 3 lesion.  He is currently on Alfazosin and is not  satisfied with his urinary symptoms  he is now interested in starting finasteride   RBUS 01/23/23 images visualized -  Enlarged prostate gland with evidence of chronic bladder outlet obstruction.  Significant median lobe. Normal-appearing kidneys ,  Prostate volume -91 cc .   Left ureteral jet is seen.  Right ureteral jet is not confidently seen.   PSA 05/22 - 4.97 % free 18   He does not have a family history of prostate cancer. on alpha blocker for bph.   11/20/2021 mpMR images and compared with prior I= 79 gm, with signif median lobe lesion 1= right posterior lateral apex peripheral zone 5 mm // T2-WI= moderate hypointense focus/mass // DCE negative (no early enhancement) // DWI= hypointense on ADC imaging // PIRADS 3 // artifact secondary to rectal gas, agree w radiologist -- 11/20/2021 this lesion appears slightly more rounded as compared with MRI 2017 however overall minimal change And I do not appreciate any hyperintensity on diffusion-weighted imaging  02/2018 PSA= 4.6 02/2020 PSA= 3.7  04/2021 PSA= 3.9 // US-- bilateral renal cysts // bladder vol= 348 cc/ pvr= 100cc  10/2021 PSA= 5.9 %FREE= 17 // PSAD= 0.07

## 2023-12-24 NOTE — ASSESSMENT
[FreeTextEntry1] : 78 year old male here for follow up known history of BPH and PSA elevation previous consultations with Dr. Valentine and Dr. Mane   PSA 8/2023 4.33 ng/ml  22% free  PSA 2/2023 4.81 ng/ml  18% free   as per prior note :  elevated PSA, BPH, prostate lesion PIRAD = 4 seen on MP MRI prostate 2017 s/p MRI guided prostate biopsy with Dr. Gallardo 3/8/2018 / TRUS 87gms/ path- BPH presents for opinion regarding rising PSA and PIRADS 3 lesion.  He is currently on Alfazosin and is not  satisfied with his urinary symptoms  he is now interested in starting finasteride   RBUS 01/23/23 images visualized -  Enlarged prostate gland with evidence of chronic bladder outlet obstruction.  Significant median lobe. Normal-appearing kidneys ,  Prostate volume -91 cc .   Left ureteral jet is seen.  Right ureteral jet is not confidently seen.   PSA 05/22 - 4.97 % free 18   He does not have a family history of prostate cancer. on alpha blocker for bph.   11/20/2021 mpMR images and compared with prior I= 79 gm, with signif median lobe lesion 1= right posterior lateral apex peripheral zone 5 mm // T2-WI= moderate hypointense focus/mass // DCE negative (no early enhancement) // DWI= hypointense on ADC imaging // PIRADS 3 // artifact secondary to rectal gas, agree w radiologist -- 11/20/2021 this lesion appears slightly more rounded as compared with MRI 2017 however overall minimal change And I do not appreciate any hyperintensity on diffusion-weighted imaging  02/2018 PSA= 4.6 02/2020 PSA= 3.7  04/2021 PSA= 3.9 // US-- bilateral renal cysts // bladder vol= 348 cc/ pvr= 100cc  10/2021 PSA= 5.9 %FREE= 17 // PSAD= 0.07  Plan 77 yo with BPH and elevated PSA - start finasteride - PSA in 6 months - US in 6 months all questions answered

## 2024-09-25 ENCOUNTER — LABORATORY RESULT (OUTPATIENT)
Age: 79
End: 2024-09-25

## 2024-10-03 ENCOUNTER — APPOINTMENT (OUTPATIENT)
Dept: UROLOGY | Facility: CLINIC | Age: 79
End: 2024-10-03
Payer: MEDICARE

## 2024-10-03 VITALS
HEART RATE: 78 BPM | DIASTOLIC BLOOD PRESSURE: 73 MMHG | SYSTOLIC BLOOD PRESSURE: 129 MMHG | BODY MASS INDEX: 23.62 KG/M2 | OXYGEN SATURATION: 96 % | WEIGHT: 165 LBS | RESPIRATION RATE: 18 BRPM | HEIGHT: 70 IN

## 2024-10-03 DIAGNOSIS — N40.1 BENIGN PROSTATIC HYPERPLASIA WITH LOWER URINARY TRACT SYMPMS: ICD-10-CM

## 2024-10-03 DIAGNOSIS — R97.20 ELEVATED PROSTATE, SPECIFIC ANTIGEN [PSA]: ICD-10-CM

## 2024-10-03 DIAGNOSIS — N13.8 BENIGN PROSTATIC HYPERPLASIA WITH LOWER URINARY TRACT SYMPMS: ICD-10-CM

## 2024-10-03 PROCEDURE — 99214 OFFICE O/P EST MOD 30 MIN: CPT

## 2024-10-03 RX ORDER — FINASTERIDE 5 MG/1
5 TABLET, FILM COATED ORAL DAILY
Qty: 90 | Refills: 3 | Status: ACTIVE | COMMUNITY
Start: 2024-10-03 | End: 1900-01-01

## 2024-10-09 NOTE — HISTORY OF PRESENT ILLNESS
[FreeTextEntry1] : 79 year old male here for follow up known history of BPH and PSA elevation he is on Alfuzosin and finasteride - with excellent response   PSA 9/2024 2.00 ng/ml / 22% free (corrects to 4 - finasteride effect) PSA 8/2023 4.33 ng/ml  22% free  PSA 2/2023 4.81 ng/ml  18% free   as per prior note :  elevated PSA, BPH, prostate lesion PIRAD = 4 seen on MP MRI prostate 2017 s/p MRI guided prostate biopsy with Dr. Gallardo 3/8/2018 / TRUS 87gms/ path- BPH presents for opinion regarding rising PSA and PIRADS 3 lesion.  He is currently on Alfazosin and is not  satisfied with his urinary symptoms  he is now interested in starting finasteride   RBUS 01/23/23 images visualized -  Enlarged prostate gland with evidence of chronic bladder outlet obstruction.  Significant median lobe. Normal-appearing kidneys ,  Prostate volume -91 cc .   Left ureteral jet is seen.  Right ureteral jet is not confidently seen.   PSA 05/22 - 4.97 % free 18   He does not have a family history of prostate cancer. on alpha blocker for bph.   11/20/2021 mpMR images and compared with prior I= 77 gm, with signif median lobe lesion 1= right posterior lateral apex peripheral zone 5 mm // T2-WI= moderate hypointense focus/mass // DCE negative (no early enhancement) // DWI= hypointense on ADC imaging // PIRADS 3 // artifact secondary to rectal gas, agree w radiologist -- 11/20/2021 this lesion appears slightly more rounded as compared with MRI 2017 however overall minimal change And I do not appreciate any hyperintensity on diffusion-weighted imaging  02/2018 PSA= 4.6 02/2020 PSA= 3.7  04/2021 PSA= 3.9 // US-- bilateral renal cysts // bladder vol= 348 cc/ pvr= 100cc  10/2021 PSA= 5.9 %FREE= 17 // PSAD= 0.07      [Urinary Retention] : no urinary retention [Urinary Urgency] : no urinary urgency [Urinary Frequency] : no urinary frequency [Straining] : no straining [Weak Stream] : no weak stream [Intermittency] : no intermittency

## 2024-10-09 NOTE — ASSESSMENT
[FreeTextEntry1] : 79 year old male here for follow up known history of BPH and PSA elevation he is on Alfuzosin and finasteride - with excellent response   PSA 9/2024 2.00 ng/ml / 22% free (corrects to 4 - finasteride effect) PSA 8/2023 4.33 ng/ml  22% free  PSA 2/2023 4.81 ng/ml  18% free   Plan 77 yo with BPH and elevated PSA - continue finasteride . Alfuzosin - PSA in 6 months - US in 6 months all questions answered

## 2025-05-29 ENCOUNTER — LABORATORY RESULT (OUTPATIENT)
Age: 80
End: 2025-05-29

## 2025-06-03 ENCOUNTER — NON-APPOINTMENT (OUTPATIENT)
Age: 80
End: 2025-06-03

## 2025-06-05 ENCOUNTER — TRANSCRIPTION ENCOUNTER (OUTPATIENT)
Age: 80
End: 2025-06-05

## 2025-06-05 ENCOUNTER — APPOINTMENT (OUTPATIENT)
Dept: UROLOGY | Facility: CLINIC | Age: 80
End: 2025-06-05
Payer: MEDICARE

## 2025-06-05 DIAGNOSIS — N13.8 BENIGN PROSTATIC HYPERPLASIA WITH LOWER URINARY TRACT SYMPMS: ICD-10-CM

## 2025-06-05 DIAGNOSIS — N40.1 BENIGN PROSTATIC HYPERPLASIA WITH LOWER URINARY TRACT SYMPMS: ICD-10-CM

## 2025-06-05 DIAGNOSIS — R97.20 ELEVATED PROSTATE, SPECIFIC ANTIGEN [PSA]: ICD-10-CM

## 2025-06-05 PROCEDURE — 99214 OFFICE O/P EST MOD 30 MIN: CPT
